# Patient Record
Sex: FEMALE | Race: ASIAN | URBAN - METROPOLITAN AREA
[De-identification: names, ages, dates, MRNs, and addresses within clinical notes are randomized per-mention and may not be internally consistent; named-entity substitution may affect disease eponyms.]

---

## 2024-01-30 ENCOUNTER — OFFICE VISIT (OUTPATIENT)
Dept: GASTROENTEROLOGY | Facility: CLINIC | Age: 39
End: 2024-01-30
Payer: COMMERCIAL

## 2024-01-30 VITALS
DIASTOLIC BLOOD PRESSURE: 68 MMHG | TEMPERATURE: 97.4 F | HEIGHT: 65 IN | SYSTOLIC BLOOD PRESSURE: 102 MMHG | WEIGHT: 119 LBS | BODY MASS INDEX: 19.83 KG/M2

## 2024-01-30 DIAGNOSIS — K29.50 CHRONIC GASTRITIS WITHOUT BLEEDING, UNSPECIFIED GASTRITIS TYPE: Primary | ICD-10-CM

## 2024-01-30 DIAGNOSIS — R10.13 DYSPEPSIA: ICD-10-CM

## 2024-01-30 DIAGNOSIS — K21.9 GASTROESOPHAGEAL REFLUX DISEASE WITHOUT ESOPHAGITIS: ICD-10-CM

## 2024-01-30 PROCEDURE — 99204 OFFICE O/P NEW MOD 45 MIN: CPT | Performed by: INTERNAL MEDICINE

## 2024-01-30 RX ORDER — SUCRALFATE 1 G/1
1 TABLET ORAL 3 TIMES DAILY
COMMUNITY
Start: 2023-11-27

## 2024-01-30 RX ORDER — PANTOPRAZOLE SODIUM 40 MG/1
40 TABLET, DELAYED RELEASE ORAL DAILY
COMMUNITY
Start: 2024-01-17 | End: 2025-01-16

## 2024-01-30 RX ORDER — RABEPRAZOLE SODIUM 20 MG/1
20 TABLET, DELAYED RELEASE ORAL 2 TIMES DAILY
Qty: 180 TABLET | Refills: 6 | Status: SHIPPED | OUTPATIENT
Start: 2024-01-30 | End: 2024-04-29

## 2024-01-30 NOTE — PROGRESS NOTES
[facility] - Outpatient Consultation  Viri Olivas 38 y.o. female MRN: [id number]  Encounter: [id number]          ASSESSMENT AND PLAN:      1. Chronic gastritis without bleeding, unspecified gastritis type  - RABEprazole (ACIPHEX) 20 MG tablet; Take 1 tablet (20 mg total) by mouth 2 (two) times a day  Dispense: 180 tablet; Refill: 6    2. Gastroesophageal reflux disease without esophagitis  - RABEprazole (ACIPHEX) 20 MG tablet; Take 1 tablet (20 mg total) by mouth 2 (two) times a day  Dispense: 180 tablet; Refill: 6    3. Dyspepsia  4.  Weight loss  - Celiac Disease Panel; Future    She is a 38-year-old female with history of gastritis and acid reflux disease presents here to establish care.  She is on Carafate 4 times a day and Protonix.  She has has history of gastritis was induced by stressors.  She has had recent stressors which have aggravated her symptoms.  She has had 2 lifetime endoscopy 1 at [facility] another 1 in [facility].  Endoscopy was within normal limits with biopsies which were negative for H. pylori.  She was diagnosed with gastritis and started on PPI therapy.  She has tried Protonix and omeprazole in the past.  She is currently on Protonix 40 mg daily.  This did not alleviate her symptoms in the past and was placed on rabeprazole.  Rabeprazole really helped after being on it for several months.  Currently Carafate 4 times a day is helping with pantoprazole but have not completely alleviated all her symptoms.  She is also working on lifestyle and diet modifications and dealing with her stressors.    Of concern is 40 pound weight loss but she states this was secondary to dyspepsia and the symptoms have dissipated and now her weight has plateaued and she is also regaining some weight.  No prior colonoscopy evaluation.    We discussed differential diagnosis currently is likely secondary to dyspepsia but would warrant a more extensive evaluation with a CT scan and  colonoscopy if symptoms do not completely resolve and he does not gain her weight back.  I did offer her imaging study on this office visit but she is currently not interested would like to hold off.  She states her symptoms are usually better controlled once gastritis resolves and this has responded well to rabeprazole in the past.  In this setting we will stop the pantoprazole and switch her over to rabeprazole twice daily.  She should also continue to closely monitor her weight.    REVIEW OF SYSTEMS:    CONSTITUTIONAL: Denies any fever, chills, rigors, and weight loss.  HEENT: No earache or tinnitus. Denies hearing loss or visual disturbances.  CARDIOVASCULAR: No chest pain or palpitations.   RESPIRATORY: Denies any cough, hemoptysis, shortness of breath or dyspnea on exertion.  GASTROINTESTINAL: As noted in the History of Present Illness.   GENITOURINARY: No problems with urination. Denies any hematuria or dysuria.  NEUROLOGIC: No dizziness or vertigo, denies headaches.   MUSCULOSKELETAL: Denies any muscle or joint pain.   SKIN: Denies skin rashes or itching.   ENDOCRINE: Denies excessive thirst. Denies intolerance to heat or cold.  PSYCHOSOCIAL: Denies depression or anxiety. Denies any recent memory loss.       Historical Information   History reviewed. No pertinent past medical history.  Past Surgical History:   Procedure Laterality Date     SECTION       Social History   Social History     Substance and Sexual Activity   Alcohol Use Not Currently     Social History     Substance and Sexual Activity   Drug Use Not on file     Social History     Tobacco Use   Smoking Status Never   Smokeless Tobacco Never     Family History   Problem Relation Age of Onset    Diabetes Mother     Asthma Father        Meds/Allergies       Current Outpatient Medications:     pantoprazole (PROTONIX) 40 mg tablet    RABEprazole (ACIPHEX) 20 MG tablet    sucralfate (CARAFATE) 1 g tablet    Prenatal Multivit-Min-Fe-FA (PRENATAL  "1 + IRON PO)    No Known Allergies        Objective     Blood pressure 102/68, temperature (!) 97.4 °F (36.3 °C), temperature source Tympanic, height 5' 5\" (1.651 m), weight 54 kg (119 lb). Body mass index is 19.8 kg/m².        PHYSICAL EXAM:      General Appearance:   Alert, cooperative, no distress   HEENT:   Normocephalic, atraumatic, anicteric.     Neck:  Supple, symmetrical, trachea midline   Lungs:   Clear to auscultation bilaterally; no rales, rhonchi or wheezing; respirations unlabored    Heart::   Regular rate and rhythm; no murmur, rub, or gallop.   Abdomen:   Soft, non-tender, non-distended; normal bowel sounds; no masses, no organomegaly    Genitalia:   Deferred    Rectal:   Deferred    Extremities:  No cyanosis, clubbing or edema    Pulses:  2+ and symmetric    Skin:  No jaundice, rashes, or lesions    Lymph nodes:  No palpable cervical lymphadenopathy        Lab Results:   No visits with results within 1 Day(s) from this visit.   Latest known visit with results is:   No results found for any previous visit.         Radiology Results:   No results found.   "

## 2024-07-19 ENCOUNTER — TELEPHONE (OUTPATIENT)
Age: 39
End: 2024-07-19